# Patient Record
Sex: FEMALE | Race: WHITE | Employment: PART TIME | URBAN - METROPOLITAN AREA
[De-identification: names, ages, dates, MRNs, and addresses within clinical notes are randomized per-mention and may not be internally consistent; named-entity substitution may affect disease eponyms.]

---

## 2024-04-17 ENCOUNTER — HOSPITAL ENCOUNTER (EMERGENCY)
Facility: HOSPITAL | Age: 34
Discharge: HOME | End: 2024-04-17
Attending: EMERGENCY MEDICINE
Payer: OTHER MISCELLANEOUS

## 2024-04-17 VITALS
TEMPERATURE: 97 F | SYSTOLIC BLOOD PRESSURE: 126 MMHG | OXYGEN SATURATION: 97 % | DIASTOLIC BLOOD PRESSURE: 84 MMHG | RESPIRATION RATE: 20 BRPM | WEIGHT: 210 LBS | HEART RATE: 74 BPM

## 2024-04-17 DIAGNOSIS — S09.90XA INJURY OF HEAD, INITIAL ENCOUNTER: Primary | ICD-10-CM

## 2024-04-17 PROCEDURE — 99283 EMERGENCY DEPT VISIT LOW MDM: CPT

## 2024-04-17 PROCEDURE — 99284 EMERGENCY DEPT VISIT MOD MDM: CPT

## 2024-04-17 NOTE — ED QUICK NOTES
PT to ED with son, reporting items fell off of shelf at work, hit patient's head, started feeling dizzy and contusion on R side of head 20 mins after incident. Works in a .

## 2024-04-17 NOTE — ED INITIAL ASSESSMENT (HPI)
Patient presents to the ED c/o head injury at work. Pt reports she was hit in the head after a shelf fell. A&ox4. Denies LOC. Denies blood thinner use. Pt ambulating to waiting room with steady gait.

## 2024-04-18 NOTE — ED PROVIDER NOTES
Patient Seen in: [facility] Emergency Department    History     Chief Complaint   Patient presents with    Head Injury       HPI    The patient presents to the ED after sustaining a head injury while at work today.  She states that some shelves fell on her head and caused an injury.  She states that she has had a mild headache ever since, denies LOC at the time of the injury or vomiting.  No vision change, neck pain or other complaints.    History reviewed. History reviewed. No pertinent past medical history.    History reviewed.   Past Surgical History:   Procedure Laterality Date    Removal gallbladder           Medications :  (Not in a hospital admission)       No family history on file.    Smoking Status:   Social History     Socioeconomic History    Marital status: Single   Tobacco Use    Smoking status: Never    Smokeless tobacco: Never   Substance and Sexual Activity    Alcohol use: Yes     Comment: occ    Drug use: Never       Constitutional and vital signs reviewed.      Social History and Family History elements reviewed from today, pertinent positives to the presenting problem noted.    Physical Exam     ED Triage Vitals   BP 04/17/24 1719 (!) 142/94   Pulse 04/17/24 1719 69   Resp 04/17/24 1719 18   Temp 04/17/24 1719 97.1 °F (36.2 °C)   Temp src 04/17/24 1719 Temporal   SpO2 04/17/24 1719 99 %   O2 Device 04/17/24 1756 None (Room air)       All measures to prevent infection transmission during my interaction with the patient were taken. Handwashing was performed prior to and after the exam.  Stethoscope and any equipment used during my examination was cleaned with super sani-cloth germicidal wipes following the exam.     Physical Exam  Vitals and nursing note reviewed.   Constitutional:       General: She is not in acute distress.     Appearance: She is well-developed.   HENT:      Head: Normocephalic.      Comments: Tenderness to the right anterior parietal scalp however no obvious skin injury  or swelling.  Eyes:      General:         Right eye: No discharge.         Left eye: No discharge.      Extraocular Movements: Extraocular movements intact.      Conjunctiva/sclera: Conjunctivae normal.      Pupils: Pupils are equal, round, and reactive to light.   Neck:      Trachea: No tracheal deviation.   Cardiovascular:      Rate and Rhythm: Normal rate and regular rhythm.   Pulmonary:      Effort: Pulmonary effort is normal. No respiratory distress.   Abdominal:      General: There is no distension.      Palpations: Abdomen is soft.   Musculoskeletal:      Cervical back: Neck supple. No tenderness.   Skin:     General: Skin is warm and dry.   Neurological:      Mental Status: She is alert and oriented to person, place, and time.   Psychiatric:         Mood and Affect: Mood normal.         Behavior: Behavior normal.         ED Course      Labs Reviewed - No data to display    As Interpreted by me    Imaging Results Available and Reviewed while in ED: No results found.  ED Medications Administered: Medications - No data to display      MDM     Vitals:    04/17/24 1719 04/17/24 1756   BP: (!) 142/94 126/84   Pulse: 69 74   Resp: 18 20   Temp: 97.1 °F (36.2 °C)    TempSrc: Temporal    SpO2: 99% 97%   Weight: 95.3 kg      *I personally reviewed and interpreted all ED vitals.    Pulse Ox: 97%, Room air, Normal     Differential Diagnosis/ Diagnostic Considerations: Head injury, concussion, other    Complicating Factors: The patient already has does not have a problem list on file. to contribute to the complexity of this ED evaluation.    Medical Decision Making  The patient presents to the ED after sustaining a head injury at work today.  No concern clinically for ICH or skull fracture.  Possible mild concussion symptoms.  Stable for discharge home with continued supportive care and outpatient follow-up.    Problems Addressed:  Injury of head, initial encounter: acute illness or injury that poses a threat to life or  bodily functions        Condition upon leaving the department: Stable    Disposition and Plan     Clinical Impression:  1. Injury of head, initial encounter        Disposition:  Discharge    Follow-up:  [facility] Emergency Department  [address]  [facility] [id number] [phone]  Follow up  If symptoms worsen      Medications Prescribed:  There are no discharge medications for this patient.

## 2024-04-19 ENCOUNTER — APPOINTMENT (OUTPATIENT)
Dept: CT IMAGING | Facility: HOSPITAL | Age: 34
End: 2024-04-19
Attending: EMERGENCY MEDICINE
Payer: OTHER MISCELLANEOUS

## 2024-04-19 ENCOUNTER — HOSPITAL ENCOUNTER (EMERGENCY)
Facility: HOSPITAL | Age: 34
Discharge: HOME | End: 2024-04-19
Attending: EMERGENCY MEDICINE
Payer: OTHER MISCELLANEOUS

## 2024-04-19 VITALS
DIASTOLIC BLOOD PRESSURE: 80 MMHG | WEIGHT: 210 LBS | BODY MASS INDEX: 37.21 KG/M2 | OXYGEN SATURATION: 99 % | HEART RATE: 62 BPM | HEIGHT: 63 IN | TEMPERATURE: 97 F | SYSTOLIC BLOOD PRESSURE: 131 MMHG | RESPIRATION RATE: 18 BRPM

## 2024-04-19 DIAGNOSIS — S06.0X0A CONCUSSION WITHOUT LOSS OF CONSCIOUSNESS, INITIAL ENCOUNTER: Primary | ICD-10-CM

## 2024-04-19 PROCEDURE — 99284 EMERGENCY DEPT VISIT MOD MDM: CPT

## 2024-04-19 PROCEDURE — 70450 CT HEAD/BRAIN W/O DYE: CPT | Performed by: EMERGENCY MEDICINE

## 2024-04-19 RX ORDER — IBUPROFEN 600 MG/1
600 TABLET ORAL ONCE
Status: COMPLETED | OUTPATIENT
Start: 2024-04-19 | End: 2024-04-19

## 2024-04-19 NOTE — ED INITIAL ASSESSMENT (HPI)
Pt c/o headache and neck pain. Per pt, was seen here on Wednesday and told she has a concussion. Pt told to come back if pain is worse. Pt states she is also having some confusion and beginning to forget \"small things\".  Denies N/V.

## 2024-04-19 NOTE — ED PROVIDER NOTES
Patient Seen in: [facility] Emergency Department      History     Chief Complaint   Patient presents with    Headache     Stated Complaint: Headache;Pain    Subjective:   HPI    33-year-old female presents for evaluation for headache, light sensitivity, and fatigue.  Patient reports that she was hit in the head a couple days ago after a shelf collapsed.  She was seen in the ED and has been having worsening headaches.  She denies any vomiting, focal neurologic symptoms, visual changes, etc.  She has been taking Tylenol for the pain.    Objective:   History reviewed. No pertinent past medical history.           Past Surgical History:   Procedure Laterality Date    Removal gallbladder                  Social History     Socioeconomic History    Marital status: Single   Tobacco Use    Smoking status: Never    Smokeless tobacco: Never   Substance and Sexual Activity    Alcohol use: Yes     Comment: occ    Drug use: Never     Social Determinants of Health     Financial Resource Strain: Low Risk  (6/21/2021)    Received from [facility]    Overall Financial Resource Strain (CARDIA)     Difficulty of Paying Living Expenses: Not hard at all   Food Insecurity: No Food Insecurity (9/8/2023)    Received from [facility]    Food Insecurity     Within the past 30 days, I worried whether my food would run out before I got money to buy more. / En los últimos 30 días, me preocupó que la comida se podía acabar antes de tener dinero para compr...: Never true / Nunca     Within the past 30 days, the food that I bought just didn't last, and I didn't have money to get more. / En los últimos 30 días, La comida que compré no rindió lo suficiente, y no tenía dinero para...: Never true / Nunca   Housing Stability: Low Risk  (6/21/2021)    Received from [facility]    Housing Stability Vital Sign     Unable to Pay for Housing in the Last Year: No     Number of Places Lived  in the Last Year: 1     Unstable Housing in the Last Year: No              Review of Systems    Positive for stated complaint: Headache;Pain  Other systems are as noted in HPI.  Constitutional and vital signs reviewed.      All other systems reviewed and negative except as noted above.    Physical Exam     ED Triage Vitals [04/19/24 1017]   BP (!) 136/91   Pulse 60   Resp 20   Temp 97.4 °F (36.3 °C)   Temp src    SpO2 99 %   O2 Device        Current:/80   Pulse 62   Temp 97.4 °F (36.3 °C)   Resp 18   Ht 160 cm (5' 3\")   Wt 95.3 kg   LMP 04/17/2024   SpO2 99%   BMI 37.20 kg/m²         Physical Exam  Vitals and nursing note reviewed.   Constitutional:       General: She is not in acute distress.     Appearance: Normal appearance.   HENT:      Head: Normocephalic and atraumatic.      Jaw: No trismus.      Right Ear: No mastoid tenderness.      Left Ear: No mastoid tenderness.      Nose: Nose normal. No nasal deformity.      Right Nostril: No epistaxis.      Left Nostril: No epistaxis.      Mouth/Throat:      Lips: Pink.      Mouth: Mucous membranes are moist. No angioedema.      Pharynx: Oropharynx is clear. Uvula midline. No oropharyngeal exudate, posterior oropharyngeal erythema or uvula swelling.      Tonsils: No tonsillar exudate or tonsillar abscesses.   Eyes:      General: Lids are normal.      Extraocular Movements: Extraocular movements intact.      Conjunctiva/sclera: Conjunctivae normal.      Pupils: Pupils are equal, round, and reactive to light.   Pulmonary:      Effort: Pulmonary effort is normal. No respiratory distress.      Breath sounds: Normal breath sounds and air entry.   Musculoskeletal:         General: No deformity. Normal range of motion.      Cervical back: Normal range of motion. No rigidity.   Skin:     General: Skin is warm and dry.      Coloration: Skin is not cyanotic.   Neurological:      General: No focal deficit present.      Mental Status: She is alert. Mental status is  at baseline.      Cranial Nerves: No cranial nerve deficit, dysarthria or facial asymmetry.      Sensory: Sensation is intact.      Motor: Motor function is intact.      Coordination: Coordination is intact.      Gait: Gait is intact.   Psychiatric:         Attention and Perception: Attention normal.         Mood and Affect: Mood normal.         Speech: Speech normal.               ED Course   Labs Reviewed - No data to display       ED Course as of 04/19/24 1329  ------------------------------------------------------------  Time: 04/19 1249  Value: CT BRAIN OR HEAD ([id number])  Comment: Per my independent interpretation, patient's CT Head demonstrates no intracranial hemorrhage.                MDM                                         Medical Decision Making  Differential diagnosis includes but is not limited to concussion, intracranial hemorrhage, tension headache, etc.    CT imaging is negative.  Patient likely with concussion.  She would like to stick with ibuprofen and Tylenol as needed for pain.  She is to follow-up with occupational health.  She has no neurologic deficits on exam.  Return precautions given.    Medical Record Review: I personally reviewed available prior medical records for any recent pertinent discharge summaries, testing, and procedures, and reviewed those reports.    Complicating factors: The patient  has no past medical history on file. and  has a past surgical history that includes removal gallbladder. that contribute to the medical complexity of this ED evaluation.     Clinical impression as well as any lab results and radiology findings were discussed with the patient and/or caregiver. I personally reviewed all laboratory results and radiology images myself. Patient is advised to follow up with PCP for reevaluation. I provided discharge instructions and return precautions. Patient and/or caregiver voices understanding and agreement with the treatment plan. All questions were addressed and  answered.         Problems Addressed:  Concussion without loss of consciousness, initial encounter: acute illness or injury with systemic symptoms    Amount and/or Complexity of Data Reviewed  Radiology: ordered and independent interpretation performed. Decision-making details documented in ED Course.    Risk  OTC drugs.        Disposition and Plan     Clinical Impression:  1. Concussion without loss of consciousness, initial encounter         Disposition:  Discharge  4/19/2024  1:21 pm    Follow-up:  [place] [id number]  [phone]  Call in 3 days      [address] [address]  [phone]  Call in 3 days            Medications Prescribed:  There are no discharge medications for this patient.

## (undated) NOTE — LETTER
Date & Time: 4/19/2024, 1:21 PM  Patient: Katie Sears  Encounter Provider(s):    Norberto Mckinney MD       To Whom It May Concern:    Katie Sears was seen and treated in our department on 4/19/2024. She can return to work once cleared by occupational health.    If you have any questions or concerns, please do not hesitate to call.        _____________________________  Physician/[facility] Signature

## (undated) NOTE — LETTER
Date & Time: 4/17/2024, 6:20 PM  Patient: Katie Sears  Encounter Provider(s):    Jason Peterson MD       To Whom It May Concern:    Katie Sears was seen and treated in our department on 4/17/2024. She should not return to work until 04/19/2024.    If you have any questions or concerns, please do not hesitate to call.        _____________________________  Physician/[facility] Signature